# Patient Record
Sex: FEMALE | URBAN - METROPOLITAN AREA
[De-identification: names, ages, dates, MRNs, and addresses within clinical notes are randomized per-mention and may not be internally consistent; named-entity substitution may affect disease eponyms.]

---

## 2021-05-05 ENCOUNTER — APPOINTMENT (RX ONLY)
Dept: URBAN - METROPOLITAN AREA CLINIC 45 | Facility: CLINIC | Age: 56
Setting detail: DERMATOLOGY
End: 2021-05-05

## 2021-05-05 DIAGNOSIS — L71.0 PERIORAL DERMATITIS: ICD-10-CM | Status: INADEQUATELY CONTROLLED

## 2021-05-05 PROCEDURE — ? PRESCRIPTION

## 2021-05-05 PROCEDURE — ? COUNSELING

## 2021-05-05 PROCEDURE — 99204 OFFICE O/P NEW MOD 45 MIN: CPT

## 2021-05-05 PROCEDURE — ? PRESCRIPTION MEDICATION MANAGEMENT

## 2021-05-05 RX ORDER — METRONIDAZOLE 10 MG/G
GEL TOPICAL BID
Qty: 1 | Refills: 0 | Status: ERX | COMMUNITY
Start: 2021-05-05

## 2021-05-05 RX ORDER — DOXYCYCLINE HYCLATE 100 MG/1
CAPSULE, GELATIN COATED ORAL
Qty: 30 | Refills: 0 | Status: ERX | COMMUNITY
Start: 2021-05-05

## 2021-05-05 RX ADMIN — METRONIDAZOLE 1: 10 GEL TOPICAL at 00:00

## 2021-05-05 RX ADMIN — DOXYCYCLINE HYCLATE: 100 CAPSULE, GELATIN COATED ORAL at 00:00

## 2021-05-05 ASSESSMENT — LOCATION SIMPLE DESCRIPTION DERM
LOCATION SIMPLE: LEFT CHEEK
LOCATION SIMPLE: CHIN
LOCATION SIMPLE: RIGHT CHEEK
LOCATION SIMPLE: SUBMENTAL CHIN

## 2021-05-05 ASSESSMENT — LOCATION DETAILED DESCRIPTION DERM
LOCATION DETAILED: LEFT CHIN
LOCATION DETAILED: RIGHT SUPERIOR CENTRAL BUCCAL CHEEK
LOCATION DETAILED: LEFT SUPERIOR LATERAL BUCCAL CHEEK
LOCATION DETAILED: SUBMENTAL CHIN

## 2021-05-05 ASSESSMENT — LOCATION ZONE DERM: LOCATION ZONE: FACE

## 2021-05-05 NOTE — PROCEDURE: PRESCRIPTION MEDICATION MANAGEMENT
Plan: CeraVe moisturizing cream: refrigerate and apply liberally daily \\nDove Bar Soap: cleanse area daily
Render In Strict Bullet Format?: No
Detail Level: Zone
Initiate Treatment: doxycycline hyclate 100 mg capsule \\nDays Supply: 30\\nSig: Take one capsule PO QD with a meal and a glass of water.\\n\\nMetrogel 1 % topical BID\\nDays Supply: 30\\nSig: Apply to rash on face twice daily

## 2021-05-05 NOTE — PROCEDURE: MIPS QUALITY
Detail Level: Detailed
Quality 110: Preventive Care And Screening: Influenza Immunization: Influenza Immunization not Administered for Documented Reasons.
Additional Notes: Patient doesn’t have time get flu vaccine

## 2021-05-05 NOTE — HPI: RASH
What Type Of Note Output Would You Prefer (Optional)?: Standard Output
How Severe Is Your Rash?: moderate
Is This A New Presentation, Or A Follow-Up?: Rash
Additional History: Patient states she has tried to treat the area with proactive and other otc acne treatments

## 2021-05-26 ENCOUNTER — APPOINTMENT (RX ONLY)
Dept: URBAN - METROPOLITAN AREA CLINIC 45 | Facility: CLINIC | Age: 56
Setting detail: DERMATOLOGY
End: 2021-05-26

## 2021-05-26 DIAGNOSIS — L70.8 OTHER ACNE: ICD-10-CM | Status: INADEQUATELY CONTROLLED

## 2021-05-26 DIAGNOSIS — L71.0 PERIORAL DERMATITIS: ICD-10-CM | Status: IMPROVED

## 2021-05-26 PROCEDURE — 99214 OFFICE O/P EST MOD 30 MIN: CPT

## 2021-05-26 PROCEDURE — ? PRESCRIPTION MEDICATION MANAGEMENT

## 2021-05-26 PROCEDURE — ? COUNSELING

## 2021-05-26 PROCEDURE — ? ORDER TESTS

## 2021-05-26 PROCEDURE — ? PRESCRIPTION

## 2021-05-26 RX ORDER — TACROLIMUS 1 MG/G
OINTMENT TOPICAL QHS
Qty: 1 | Refills: 0 | Status: ERX | COMMUNITY
Start: 2021-05-26

## 2021-05-26 RX ORDER — DOXYCYCLINE HYCLATE 100 MG/1
CAPSULE, GELATIN COATED ORAL QD
Qty: 30 | Refills: 1 | Status: ERX

## 2021-05-26 RX ORDER — TRIFAROTENE 50 UG/G
CREAM TOPICAL
Qty: 1 | Refills: 1 | Status: ERX | COMMUNITY
Start: 2021-05-26

## 2021-05-26 RX ORDER — SPIRONOLACTONE 50 MG/1
TABLET, FILM COATED ORAL
Qty: 30 | Refills: 1 | Status: ERX | COMMUNITY
Start: 2021-05-26

## 2021-05-26 RX ADMIN — SPIRONOLACTONE 1: 50 TABLET, FILM COATED ORAL at 00:00

## 2021-05-26 RX ADMIN — TACROLIMUS 1: 1 OINTMENT TOPICAL at 00:00

## 2021-05-26 RX ADMIN — TRIFAROTENE 1: 50 CREAM TOPICAL at 00:00

## 2021-05-26 ASSESSMENT — LOCATION SIMPLE DESCRIPTION DERM
LOCATION SIMPLE: CHIN
LOCATION SIMPLE: LEFT SUBMANDIBULAR AREA
LOCATION SIMPLE: RIGHT CHEEK
LOCATION SIMPLE: RIGHT SUBMANDIBULAR AREA
LOCATION SIMPLE: SUBMENTAL CHIN
LOCATION SIMPLE: LEFT CHEEK

## 2021-05-26 ASSESSMENT — LOCATION DETAILED DESCRIPTION DERM
LOCATION DETAILED: LEFT CHIN
LOCATION DETAILED: LEFT SUBMANDIBULAR AREA
LOCATION DETAILED: RIGHT SUPERIOR CENTRAL BUCCAL CHEEK
LOCATION DETAILED: LEFT SUPERIOR LATERAL BUCCAL CHEEK
LOCATION DETAILED: SUBMENTAL CHIN
LOCATION DETAILED: RIGHT SUBMANDIBULAR AREA

## 2021-05-26 ASSESSMENT — SEVERITY ASSESSMENT OVERALL AMONG ALL PATIENTS
IN YOUR EXPERIENCE, AMONG ALL PATIENTS YOU HAVE SEEN WITH THIS CONDITION, HOW SEVERE IS THIS PATIENT'S CONDITION?: INFLAMMATORY LESIONS MORE APPARENT; MANY COMEDONES AND PAPULES/PUSTULES, +/- FEW NODULOCYSTIC LESIONS

## 2021-05-26 ASSESSMENT — LOCATION ZONE DERM: LOCATION ZONE: FACE

## 2021-05-26 ASSESSMENT — INVESTIGATOR STATIC GLOBAL ASSESSMENT: IN YOUR EXPERIENCE, AMONG ALL PATIENTS YOU HAVE SEEN WITH THIS CONDITION, HOW SEVERE IS THIS PATIENT'S CONDITION?: MILD

## 2021-05-26 NOTE — PROCEDURE: PRESCRIPTION MEDICATION MANAGEMENT
Plan: CeraVe moisturizing cream: refrigerate and apply liberally daily \\nDove Bar Soap: cleanse area daily
Render In Strict Bullet Format?: No
Detail Level: Zone
Discontinue Regimen: Metrogel 1 % topical BID\\nSig: Apply to rash on face twice daily
Initiate Treatment: Protopic 0.1 % topical ointment QHS\\nSig: Apply to affected areas QHS.
Continue Regimen: doxycycline hyclate 100 mg capsule \\nSig: Take one capsule PO QD with a meal and a glass of water x 2 additional months.
Initiate Treatment: doxycycline hyclate 100 mg capsule QD\\nSig: Take one pill once daily with a full glass of water, do not lie down until 1 hour after taking.\\n\\nspironolactone 50 mg tablet \\nSig: take 1 tablet QD\\n\\nAklief 0.005 % topical cream \\nSig: Apply to jawline 3 nights per week working up to nightly as tolerated. Instructed patient to apply Aklief only to jawline.

## 2021-05-26 NOTE — PROCEDURE: ORDER TESTS
Expected Date Of Service: 05/26/2021
Billing Type: Third-Party Bill
Bill For Surgical Tray: no
Performing Laboratory: [id number]

## 2021-05-26 NOTE — PROCEDURE: COUNSELING
Detail Level: Zone
Doxycycline Counseling:  Patient counseled regarding possible photosensitivity and increased risk for sunburn.  Patient instructed to avoid sunlight, if possible.  When exposed to sunlight, patients should wear protective clothing, sunglasses, and sunscreen.  The patient was instructed to call the office immediately if the following severe adverse effects occur:  hearing changes, easy bruising/bleeding, severe headache, or vision changes.  The patient verbalized understanding of the proper use and possible adverse effects of doxycycline.  All of the patient's questions and concerns were addressed.
Topical Retinoid counseling:  Patient advised to apply a pea-sized amount only at bedtime and wait 30 minutes after washing their face before applying.  If too drying, patient may add a non-comedogenic moisturizer. The patient verbalized understanding of the proper use and possible adverse effects of retinoids.  All of the patient's questions and concerns were addressed.
Tetracycline Counseling: Patient counseled regarding possible photosensitivity and increased risk for sunburn.  Patient instructed to avoid sunlight, if possible.  When exposed to sunlight, patients should wear protective clothing, sunglasses, and sunscreen.  The patient was instructed to call the office immediately if the following severe adverse effects occur:  hearing changes, easy bruising/bleeding, severe headache, or vision changes.  The patient verbalized understanding of the proper use and possible adverse effects of tetracycline.  All of the patient's questions and concerns were addressed. Patient understands to avoid pregnancy while on therapy due to potential birth defects.
Use Enhanced Medication Counseling?: No
Bactrim Pregnancy And Lactation Text: This medication is Pregnancy Category D and is known to cause fetal risk.  It is also excreted in breast milk.
Isotretinoin Pregnancy And Lactation Text: This medication is Pregnancy Category X and is considered extremely dangerous during pregnancy. It is unknown if it is excreted in breast milk.
Topical Clindamycin Pregnancy And Lactation Text: This medication is Pregnancy Category B and is considered safe during pregnancy. It is unknown if it is excreted in breast milk.
Dapsone Pregnancy And Lactation Text: This medication is Pregnancy Category C and is not considered safe during pregnancy or breast feeding.
Doxycycline Pregnancy And Lactation Text: This medication is Pregnancy Category D and not consider safe during pregnancy. It is also excreted in breast milk but is considered safe for shorter treatment courses.
Topical Retinoid Pregnancy And Lactation Text: This medication is Pregnancy Category C. It is unknown if this medication is excreted in breast milk.
Sarecycline Counseling: Patient advised regarding possible photosensitivity and discoloration of the teeth, skin, lips, tongue and gums.  Patient instructed to avoid sunlight, if possible.  When exposed to sunlight, patients should wear protective clothing, sunglasses, and sunscreen.  The patient was instructed to call the office immediately if the following severe adverse effects occur:  hearing changes, easy bruising/bleeding, severe headache, or vision changes.  The patient verbalized understanding of the proper use and possible adverse effects of sarecycline.  All of the patient's questions and concerns were addressed.
Detail Level: Detailed
Tetracycline Pregnancy And Lactation Text: This medication is Pregnancy Category D and not consider safe during pregnancy. It is also excreted in breast milk.
Bactrim Counseling:  I discussed with the patient the risks of sulfa antibiotics including but not limited to GI upset, allergic reaction, drug rash, diarrhea, dizziness, photosensitivity, and yeast infections.  Rarely, more serious reactions can occur including but not limited to aplastic anemia, agranulocytosis, methemoglobinemia, blood dyscrasias, liver or kidney failure, lung infiltrates or desquamative/blistering drug rashes.
High Dose Vitamin A Counseling: Side effects reviewed, pt to contact office should one occur.
Topical Sulfur Applications Counseling: Topical Sulfur Counseling: Patient counseled that this medication may cause skin irritation or allergic reactions.  In the event of skin irritation, the patient was advised to reduce the amount of the drug applied or use it less frequently.   The patient verbalized understanding of the proper use and possible adverse effects of topical sulfur application.  All of the patient's questions and concerns were addressed.
Dapsone Counseling: I discussed with the patient the risks of dapsone including but not limited to hemolytic anemia, agranulocytosis, rashes, methemoglobinemia, kidney failure, peripheral neuropathy, headaches, GI upset, and liver toxicity.  Patients who start dapsone require monitoring including baseline LFTs and weekly CBCs for the first month, then every month thereafter.  The patient verbalized understanding of the proper use and possible adverse effects of dapsone.  All of the patient's questions and concerns were addressed.
Erythromycin Counseling:  I discussed with the patient the risks of erythromycin including but not limited to GI upset, allergic reaction, drug rash, diarrhea, increase in liver enzymes, and yeast infections.
Tazorac Counseling:  Patient advised that medication is irritating and drying.  Patient may need to apply sparingly and wash off after an hour before eventually leaving it on overnight.  The patient verbalized understanding of the proper use and possible adverse effects of tazorac.  All of the patient's questions and concerns were addressed.
Azithromycin Pregnancy And Lactation Text: This medication is considered safe during pregnancy and is also secreted in breast milk.
High Dose Vitamin A Pregnancy And Lactation Text: High dose vitamin A therapy is contraindicated during pregnancy and breast feeding.
Topical Sulfur Applications Pregnancy And Lactation Text: This medication is Pregnancy Category C and has an unknown safety profile during pregnancy. It is unknown if this topical medication is excreted in breast milk.
Benzoyl Peroxide Counseling: Patient counseled that medicine may cause skin irritation and bleach clothing.  In the event of skin irritation, the patient was advised to reduce the amount of the drug applied or use it less frequently.   The patient verbalized understanding of the proper use and possible adverse effects of benzoyl peroxide.  All of the patient's questions and concerns were addressed.
Erythromycin Pregnancy And Lactation Text: This medication is Pregnancy Category B and is considered safe during pregnancy. It is also excreted in breast milk.
Tazorac Pregnancy And Lactation Text: This medication is not safe during pregnancy. It is unknown if this medication is excreted in breast milk.
Spironolactone Counseling: Patient advised regarding risks of diarrhea, abdominal pain, hyperkalemia, birth defects (for female patients), liver toxicity and renal toxicity. The patient may need blood work to monitor liver and kidney function and potassium levels while on therapy. The patient verbalized understanding of the proper use and possible adverse effects of spironolactone.  All of the patient's questions and concerns were addressed.
Birth Control Pills Pregnancy And Lactation Text: This medication should be avoided if pregnant and for the first 30 days post-partum.
Azithromycin Counseling:  I discussed with the patient the risks of azithromycin including but not limited to GI upset, allergic reaction, drug rash, diarrhea, and yeast infections.
Minocycline Counseling: Patient advised regarding possible photosensitivity and discoloration of the teeth, skin, lips, tongue and gums.  Patient instructed to avoid sunlight, if possible.  When exposed to sunlight, patients should wear protective clothing, sunglasses, and sunscreen.  The patient was instructed to call the office immediately if the following severe adverse effects occur:  hearing changes, easy bruising/bleeding, severe headache, or vision changes.  The patient verbalized understanding of the proper use and possible adverse effects of minocycline.  All of the patient's questions and concerns were addressed.
Benzoyl Peroxide Pregnancy And Lactation Text: This medication is Pregnancy Category C. It is unknown if benzoyl peroxide is excreted in breast milk.
Patient Specific Counseling (Will Not Stick From Patient To Patient): Discussed with pt using pellets the testosterone in the pellets is likely significantly contributing to the acne, discussed possible interference with pellets and the spironolactone, she will discuss with the physician who manages her pellets and prefers to start the medication at this time, plans to continue taking the pellets. Discussed use of retinoid only to jaw/chin area of outbreak to not exacerbate the improving periorificial dermatitis and understanding verbalized. Pt has had hysterectomy total. Will obtain baseline lab to check k+, will monitor k+ annually.
Spironolactone Pregnancy And Lactation Text: This medication can cause feminization of the male fetus and should be avoided during pregnancy. The active metabolite is also found in breast milk.
Birth Control Pills Counseling: Birth Control Pill Counseling: I discussed with the patient the potential side effects of OCPs including but not limited to increased risk of stroke, heart attack, thrombophlebitis, deep venous thrombosis, hepatic adenomas, breast changes, GI upset, headaches, and depression.  The patient verbalized understanding of the proper use and possible adverse effects of OCPs. All of the patient's questions and concerns were addressed.
Isotretinoin Counseling: Patient should get monthly blood tests, not donate blood, not drive at night if vision affected, not share medication, and not undergo elective surgery for 6 months after tx completed. Side effects reviewed, pt to contact office should one occur.
Topical Clindamycin Counseling: Patient counseled that this medication may cause skin irritation or allergic reactions.  In the event of skin irritation, the patient was advised to reduce the amount of the drug applied or use it less frequently.   The patient verbalized understanding of the proper use and possible adverse effects of clindamycin.  All of the patient's questions and concerns were addressed.